# Patient Record
Sex: MALE | Race: WHITE | NOT HISPANIC OR LATINO | Employment: FULL TIME | URBAN - METROPOLITAN AREA
[De-identification: names, ages, dates, MRNs, and addresses within clinical notes are randomized per-mention and may not be internally consistent; named-entity substitution may affect disease eponyms.]

---
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
## 2021-06-02 ENCOUNTER — RECORDS - HEALTHEAST (OUTPATIENT)
Dept: ADMINISTRATIVE | Facility: CLINIC | Age: 21
End: 2021-06-02

## 2022-02-16 ENCOUNTER — APPOINTMENT (OUTPATIENT)
Dept: CT IMAGING | Facility: CLINIC | Age: 22
End: 2022-02-16
Attending: EMERGENCY MEDICINE
Payer: COMMERCIAL

## 2022-02-16 ENCOUNTER — HOSPITAL ENCOUNTER (EMERGENCY)
Facility: CLINIC | Age: 22
Discharge: HOME | End: 2022-02-16
Attending: EMERGENCY MEDICINE | Admitting: EMERGENCY MEDICINE
Payer: COMMERCIAL

## 2022-02-16 VITALS
HEIGHT: 66 IN | RESPIRATION RATE: 16 BRPM | TEMPERATURE: 99.8 F | SYSTOLIC BLOOD PRESSURE: 119 MMHG | BODY MASS INDEX: 24.11 KG/M2 | DIASTOLIC BLOOD PRESSURE: 49 MMHG | HEART RATE: 100 BPM | WEIGHT: 150 LBS | OXYGEN SATURATION: 97 %

## 2022-02-16 DIAGNOSIS — R10.31 ABDOMINAL PAIN, ACUTE, RIGHT LOWER QUADRANT: ICD-10-CM

## 2022-02-16 DIAGNOSIS — K92.0 HEMATEMESIS WITH NAUSEA: ICD-10-CM

## 2022-02-16 DIAGNOSIS — R10.13 ABDOMINAL PAIN, EPIGASTRIC: ICD-10-CM

## 2022-02-16 LAB
ALBUMIN SERPL-MCNC: 4.6 G/DL (ref 3.4–5)
ALP SERPL-CCNC: 95 U/L (ref 40–150)
ALT SERPL W P-5'-P-CCNC: 18 U/L (ref 0–70)
ANION GAP SERPL CALCULATED.3IONS-SCNC: 4 MMOL/L (ref 3–14)
AST SERPL W P-5'-P-CCNC: 17 U/L (ref 0–45)
BASOPHILS # BLD AUTO: 0 10E3/UL (ref 0–0.2)
BASOPHILS NFR BLD AUTO: 0 %
BILIRUB SERPL-MCNC: 0.6 MG/DL (ref 0.2–1.3)
BUN SERPL-MCNC: 18 MG/DL (ref 7–30)
CALCIUM SERPL-MCNC: 9.8 MG/DL (ref 8.5–10.1)
CHLORIDE BLD-SCNC: 105 MMOL/L (ref 94–109)
CO2 SERPL-SCNC: 28 MMOL/L (ref 20–32)
CREAT SERPL-MCNC: 0.78 MG/DL (ref 0.66–1.25)
EOSINOPHIL # BLD AUTO: 0.1 10E3/UL (ref 0–0.7)
EOSINOPHIL NFR BLD AUTO: 1 %
ERYTHROCYTE [DISTWIDTH] IN BLOOD BY AUTOMATED COUNT: 12.9 % (ref 10–15)
GFR SERPL CREATININE-BSD FRML MDRD: >90 ML/MIN/1.73M2
GLUCOSE BLD-MCNC: 86 MG/DL (ref 70–99)
HCT VFR BLD AUTO: 46.6 % (ref 40–53)
HGB BLD-MCNC: 15.3 G/DL (ref 13.3–17.7)
HOLD SPECIMEN: NORMAL
HOLD SPECIMEN: NORMAL
IMM GRANULOCYTES # BLD: 0.1 10E3/UL
IMM GRANULOCYTES NFR BLD: 0 %
LIPASE SERPL-CCNC: 110 U/L (ref 73–393)
LYMPHOCYTES # BLD AUTO: 0.4 10E3/UL (ref 0.8–5.3)
LYMPHOCYTES NFR BLD AUTO: 3 %
MCH RBC QN AUTO: 30.2 PG (ref 26.5–33)
MCHC RBC AUTO-ENTMCNC: 32.8 G/DL (ref 31.5–36.5)
MCV RBC AUTO: 92 FL (ref 78–100)
MONOCYTES # BLD AUTO: 1 10E3/UL (ref 0–1.3)
MONOCYTES NFR BLD AUTO: 7 %
NEUTROPHILS # BLD AUTO: 12.4 10E3/UL (ref 1.6–8.3)
NEUTROPHILS NFR BLD AUTO: 89 %
NRBC # BLD AUTO: 0 10E3/UL
NRBC BLD AUTO-RTO: 0 /100
PLATELET # BLD AUTO: 282 10E3/UL (ref 150–450)
POTASSIUM BLD-SCNC: 4.3 MMOL/L (ref 3.4–5.3)
PROT SERPL-MCNC: 8.7 G/DL (ref 6.8–8.8)
RBC # BLD AUTO: 5.06 10E6/UL (ref 4.4–5.9)
SARS-COV-2 RNA RESP QL NAA+PROBE: NEGATIVE
SODIUM SERPL-SCNC: 137 MMOL/L (ref 133–144)
WBC # BLD AUTO: 13.9 10E3/UL (ref 4–11)

## 2022-02-16 PROCEDURE — 99285 EMERGENCY DEPT VISIT HI MDM: CPT | Mod: 25

## 2022-02-16 PROCEDURE — 74177 CT ABD & PELVIS W/CONTRAST: CPT

## 2022-02-16 PROCEDURE — 87635 SARS-COV-2 COVID-19 AMP PRB: CPT | Performed by: EMERGENCY MEDICINE

## 2022-02-16 PROCEDURE — 83690 ASSAY OF LIPASE: CPT | Performed by: EMERGENCY MEDICINE

## 2022-02-16 PROCEDURE — 250N000009 HC RX 250: Performed by: EMERGENCY MEDICINE

## 2022-02-16 PROCEDURE — 85025 COMPLETE CBC W/AUTO DIFF WBC: CPT | Performed by: EMERGENCY MEDICINE

## 2022-02-16 PROCEDURE — 250N000011 HC RX IP 250 OP 636: Performed by: EMERGENCY MEDICINE

## 2022-02-16 PROCEDURE — 36415 COLL VENOUS BLD VENIPUNCTURE: CPT | Performed by: EMERGENCY MEDICINE

## 2022-02-16 PROCEDURE — 80053 COMPREHEN METABOLIC PANEL: CPT | Performed by: EMERGENCY MEDICINE

## 2022-02-16 PROCEDURE — C9803 HOPD COVID-19 SPEC COLLECT: HCPCS

## 2022-02-16 RX ORDER — IOPAMIDOL 755 MG/ML
75 INJECTION, SOLUTION INTRAVASCULAR ONCE
Status: COMPLETED | OUTPATIENT
Start: 2022-02-16 | End: 2022-02-16

## 2022-02-16 RX ADMIN — SODIUM CHLORIDE 62 ML: 9 INJECTION, SOLUTION INTRAVENOUS at 12:22

## 2022-02-16 RX ADMIN — IOPAMIDOL 75 ML: 755 INJECTION, SOLUTION INTRAVENOUS at 12:22

## 2022-02-16 ASSESSMENT — ENCOUNTER SYMPTOMS
CHILLS: 0
VOMITING: 1
FEVER: 0
CONSTIPATION: 1
NAUSEA: 1
ABDOMINAL PAIN: 1
BLOOD IN STOOL: 0

## 2022-02-16 NOTE — ED PROVIDER NOTES
History   Chief Complaint:  Hematemesis      The history is provided by the patient.      Keenan Abdi is a 21 year old male with no significant past medical history who presents with hematemesis. This morning, the patient was initially constipated but was able to pass a bowel movement. He had no hematochezia or melena at that time. He went to work and then, a couple hrs prior to arrival, the patient was driving between job sites when he had a sudden onset of nausea and vomited blood. Afterwards, he felt fine and went to grab his phone to take a picture of the emesis, but then he suddenly had 3-4 episodes of vomiting that was a mix of blood and stomach acid. Since then, he has felt upper abdominal pain. He's had no similar episodes of hematemesis in the past. He denies any fevers, chills, urinary symptoms, or history of problems with his pancreas or gallbladder. He's had no recent trauma to his abdomen. The patient occasionally drinks alcohol, but denies heavy use. He also denies any drug use.     Of note, the patient has back pain at baseline.     Review of Systems   Constitutional: Negative for chills and fever.   Gastrointestinal: Positive for abdominal pain (upper), constipation (resolved this morning), nausea and vomiting (hematemesis). Negative for blood in stool.        No hematochezia  No melena   Genitourinary:        No urinary symptoms   All other systems reviewed and are negative.    Allergies:  The patient has no known allergies.     Medications:  MOTRIN OR  sulfamethoxazole-trimethoprim in D5W infusion    Past Medical History:     The patient denies any past medical history.     Past Surgical History:    Tonsillectomy and adenoidectomy   PE tubes     Social History:  The patient is employed.   He presents to the emergency department alone.     Physical Exam     Patient Vitals for the past 24 hrs:   BP Temp Temp src Pulse Resp SpO2 Height Weight   02/16/22 1300 -- -- -- -- 16 97 % -- --   02/16/22  "1245 -- -- -- -- -- 99 % -- --   02/16/22 1230 -- -- -- -- -- 100 % -- --   02/16/22 1215 -- -- -- -- -- 97 % -- --   02/16/22 1134 -- 99.8  F (37.7  C) Temporal -- -- -- -- --   02/16/22 1133 119/49 -- Temporal 100 20 98 % 1.676 m (5' 6\") 68 kg (150 lb)     Physical Exam  SKIN:  Warm, dry.  No jaundice.  HEMATOLOGIC/IMMUNOLOGIC/LYMPHATIC:  No pallor.  HENT:  Moist oral mucosa.  EYES:  Conjunctivae normal.  Anicteric.    CARDIOVASCULAR:  Regular rate and rhythm.  No murmur.  RESPIRATORY:  No respiratory distress, breath sounds equal and normal.  GASTROINTESTINAL:  Soft tender abdomen at the epigastrium and right lower quadrant, no distention, no palpable mass, active bowel sounds.  MUSCULOSKELETAL: Normal body habitus.  NEUROLOGIC:  Alert, conversant.  PSYCHIATRIC:  Normal mood.    Emergency Department Course     Imaging:  CT Abdomen Pelvis w Contrast   Final Result   IMPRESSION: No CT findings to explain patient's symptoms of epigastric   pain.      HAMIDA GONZALEZ MD            SYSTEM ID:  [id number]        Report per radiology    Laboratory:  Labs Ordered and Resulted from Time of ED Arrival to Time of ED Departure   CBC WITH PLATELETS AND DIFFERENTIAL - Abnormal       Result Value    WBC Count 13.9 (*)     RBC Count 5.06      Hemoglobin 15.3      Hematocrit 46.6      MCV 92      MCH 30.2      MCHC 32.8      RDW 12.9      Platelet Count 282      % Neutrophils 89      % Lymphocytes 3      % Monocytes 7      % Eosinophils 1      % Basophils 0      % Immature Granulocytes 0      NRBCs per 100 WBC 0      Absolute Neutrophils 12.4 (*)     Absolute Lymphocytes 0.4 (*)     Absolute Monocytes 1.0      Absolute Eosinophils 0.1      Absolute Basophils 0.0      Absolute Immature Granulocytes 0.1      Absolute NRBCs 0.0     COMPREHENSIVE METABOLIC PANEL - Normal    Sodium 137      Potassium 4.3      Chloride 105      Carbon Dioxide (CO2) 28      Anion Gap 4      Urea Nitrogen 18      Creatinine 0.78      Calcium 9.8      " Glucose 86      Alkaline Phosphatase 95      AST 17      ALT 18      Protein Total 8.7      Albumin 4.6      Bilirubin Total 0.6      GFR Estimate >90     LIPASE - Normal    Lipase 110          Procedures  none    Emergency Department Course:     Reviewed:  I reviewed nursing notes, vitals, past medical history, Care Everywhere and [facility]    Assessments:  1140 I obtained history and examined the patient as noted above.   1250 I rechecked the patient and explained findings.     Disposition:  The patient was discharged to home.     Impression & Plan     Medical Decision Making:  This patient presents concerned about several episodes of vomiting the first of which was notable for hematemesis.  Given this and associated abdominal pain the above evaluation was undertaken with concern for bowel perforation or obstruction.  Beyond leukocytosis the evaluation was reassuring.  Lab work negative for pancreatitis.  Liver function reassuring.  Not anemic with respect to hematemesis.  Gallbladder identified on CT scan and there were no obvious stones.  I certainly doubt biliary tract disease with associated hematemesis.  The patient did not require treatment during his time in the ED.  He felt well enough for discharge home.  I advised to return if any concerns.    Diagnosis:    ICD-10-CM    1. Hematemesis with nausea  K92.0    2. Abdominal pain, epigastric  R10.13    3. Abdominal pain, acute, right lower quadrant  R10.31        Discharge Medications:  Discharge Medication List as of 2/16/2022 12:56 PM          Scribe Disclosure:  Clara FIGUEROA, am serving as a scribe at 11:40 AM on 2/16/2022 to document services personally performed by Jatin Li MD based on my observations and the provider's statements to me.        Jatin Li MD  02/16/22 5220

## 2022-02-23 PROCEDURE — 88305 TISSUE EXAM BY PATHOLOGIST: CPT | Performed by: PATHOLOGY

## 2022-02-24 ENCOUNTER — LAB REQUISITION (OUTPATIENT)
Dept: LAB | Facility: CLINIC | Age: 22
End: 2022-02-24

## 2022-02-24 DIAGNOSIS — R19.7 DIARRHEA, UNSPECIFIED: ICD-10-CM

## 2022-02-24 DIAGNOSIS — K92.1 MELENA: ICD-10-CM

## 2022-02-24 DIAGNOSIS — K92.0 HEMATEMESIS: ICD-10-CM

## 2022-02-25 LAB
PATH REPORT.COMMENTS IMP SPEC: NORMAL
PATH REPORT.COMMENTS IMP SPEC: NORMAL
PATH REPORT.FINAL DX SPEC: NORMAL
PATH REPORT.GROSS SPEC: NORMAL
PATH REPORT.MICROSCOPIC SPEC OTHER STN: NORMAL
PATH REPORT.RELEVANT HX SPEC: NORMAL
PHOTO IMAGE: NORMAL